# Patient Record
(demographics unavailable — no encounter records)

---

## 2025-06-25 NOTE — HISTORY OF PRESENT ILLNESS
[Normal] : Normal [Sleeps ___ hours per night] : sleeps [unfilled] hours per night [Brushing teeth twice/d] : brushing teeth twice per day [Yes] : Patient goes to dentist yearly [Vitamin] : Primary Fluoride Source: Vitamin [Parents] : parents [< 2 hrs of screen time per day] : less than 2 hrs of screen time per day [Grade ___] : Grade [unfilled] [Adequate performance] : adequate performance [No] : No cigarette smoke exposure [Up to date] : Up to date [NO] : No [Exposure to electronic nicotine delivery system] : No exposure to electronic nicotine delivery system [de-identified] : Eats all foods. Drinks Pediasure 4-8 oz, lemonade, soda.

## 2025-06-25 NOTE — HISTORY OF PRESENT ILLNESS
[Normal] : Normal [Sleeps ___ hours per night] : sleeps [unfilled] hours per night [Brushing teeth twice/d] : brushing teeth twice per day [Yes] : Patient goes to dentist yearly [Vitamin] : Primary Fluoride Source: Vitamin [Parents] : parents [< 2 hrs of screen time per day] : less than 2 hrs of screen time per day [Grade ___] : Grade [unfilled] [Adequate performance] : adequate performance [No] : No cigarette smoke exposure [Up to date] : Up to date [NO] : No [Exposure to electronic nicotine delivery system] : No exposure to electronic nicotine delivery system [de-identified] : Eats all foods. Drinks Pediasure 4-8 oz, lemonade, soda.

## 2025-06-25 NOTE — DISCUSSION/SUMMARY
[Normal Growth] : growth [Normal Development] : development [None] : No known medical problems [No Elimination Concerns] : elimination [No Feeding Concerns] : feeding [No Skin Concerns] : skin [Normal Sleep Pattern] : sleep [School] : school [Development and Mental Health] : development and mental health [Nutrition and Physical Activity] : nutrition and physical activity [Oral Health] : oral health [Safety] : safety [No Medications] : ~He/She~ is not on any medications [Patient] : patient [FreeTextEntry1] :  .7 year old female here for [facility] Doing well  Discussed and counseled on components of 5-2-1-0: healthy active living with patient and family.   Recommended:  5 servings of fruits and vegetables per day, less than 2 hours of screen time per day, 1 hour of exercise per day, and ZERO sugar sweetened beverages. Continue to brush teeth twice daily with fluoride-containing toothpaste and make appointment to see dentist. Help child to maintain consistent daily routines and sleep schedule.  Personal hygiene and puberty explained.  School discussed. Ensure home is safe. Teach child about personal safety.  Use consistent, positive discipline.   SDOH domains were screened and scored - no concerns. Return 1 year for routine well child check.

## 2025-06-25 NOTE — DISCUSSION/SUMMARY
[Normal Growth] : growth [Normal Development] : development [None] : No known medical problems [No Elimination Concerns] : elimination [No Feeding Concerns] : feeding [No Skin Concerns] : skin [Normal Sleep Pattern] : sleep [School] : school [Development and Mental Health] : development and mental health [Nutrition and Physical Activity] : nutrition and physical activity [Oral Health] : oral health [Safety] : safety [No Medications] : ~He/She~ is not on any medications [Patient] : patient [FreeTextEntry1] :  .7 year old female here for [facility] Doing well  Discussed and counseled on components of 5-2-1-0: healthy active living with patient and family.   Recommended:  5 servings of fruits and vegetables per day, less than 2 hours of screen time per day, 1 hour of exercise per day, and ZERO sugar sweetened beverages. Continue to brush teeth twice daily with fluoride-containing toothpaste and make appointment to see dentist. Help child to maintain consistent daily routines and sleep schedule.  Personal hygiene and puberty explained.  School discussed. Ensure home is safe. Teach child about personal safety.  Use consistent, positive discipline.   SDOH domains were screened and scored - no concerns. Return 1 year for routine well child check.